# Patient Record
Sex: FEMALE | Race: BLACK OR AFRICAN AMERICAN | Employment: UNEMPLOYED | URBAN - METROPOLITAN AREA
[De-identification: names, ages, dates, MRNs, and addresses within clinical notes are randomized per-mention and may not be internally consistent; named-entity substitution may affect disease eponyms.]

---

## 2024-08-12 ENCOUNTER — TELEPHONE (OUTPATIENT)
Dept: ENDOCRINOLOGY | Facility: CLINIC | Age: 49
End: 2024-08-12

## 2024-08-12 ENCOUNTER — VIRTUAL VISIT (OUTPATIENT)
Dept: ENDOCRINOLOGY | Facility: CLINIC | Age: 49
End: 2024-08-12
Payer: MEDICARE

## 2024-08-12 VITALS — WEIGHT: 225 LBS

## 2024-08-12 DIAGNOSIS — E66.01 CLASS 2 SEVERE OBESITY DUE TO EXCESS CALORIES WITH SERIOUS COMORBIDITY IN ADULT, UNSPECIFIED BMI (H): Primary | ICD-10-CM

## 2024-08-12 DIAGNOSIS — E66.812 CLASS 2 SEVERE OBESITY DUE TO EXCESS CALORIES WITH SERIOUS COMORBIDITY IN ADULT, UNSPECIFIED BMI (H): Primary | ICD-10-CM

## 2024-08-12 PROCEDURE — 99204 OFFICE O/P NEW MOD 45 MIN: CPT | Mod: 95 | Performed by: INTERNAL MEDICINE

## 2024-08-12 RX ORDER — FERROUS GLUCONATE 324(38)MG
324 TABLET ORAL
COMMUNITY
Start: 2024-06-11

## 2024-08-12 RX ORDER — FERROUS SULFATE 325(65) MG
325 TABLET ORAL
COMMUNITY
Start: 2024-02-14

## 2024-08-12 RX ORDER — PHENTERMINE HYDROCHLORIDE 15 MG/1
15 CAPSULE ORAL EVERY MORNING
Qty: 30 CAPSULE | Refills: 5 | Status: SHIPPED | OUTPATIENT
Start: 2024-08-12

## 2024-08-12 RX ORDER — ALBUTEROL SULFATE 90 UG/1
2 AEROSOL, METERED RESPIRATORY (INHALATION)
COMMUNITY
Start: 2023-05-31

## 2024-08-12 RX ORDER — HYDROCHLOROTHIAZIDE 25 MG/1
1 TABLET ORAL DAILY
COMMUNITY
Start: 2024-07-22

## 2024-08-12 RX ORDER — PROPRANOLOL HYDROCHLORIDE 20 MG/1
20 TABLET ORAL
COMMUNITY
Start: 2024-05-29

## 2024-08-12 RX ORDER — LOSARTAN POTASSIUM 25 MG/1
1 TABLET ORAL DAILY
COMMUNITY
Start: 2024-07-18

## 2024-08-12 ASSESSMENT — SLEEP AND FATIGUE QUESTIONNAIRES
HOW LIKELY ARE YOU TO NOD OFF OR FALL ASLEEP WHILE LYING DOWN TO REST IN THE AFTERNOON WHEN CIRCUMSTANCES PERMIT: SLIGHT CHANCE OF DOZING
HOW LIKELY ARE YOU TO NOD OFF OR FALL ASLEEP WHILE SITTING AND TALKING TO SOMEONE: WOULD NEVER DOZE
HOW LIKELY ARE YOU TO NOD OFF OR FALL ASLEEP WHILE SITTING INACTIVE IN A PUBLIC PLACE: SLIGHT CHANCE OF DOZING
HOW LIKELY ARE YOU TO NOD OFF OR FALL ASLEEP WHILE SITTING QUIETLY AFTER LUNCH WITHOUT ALCOHOL: WOULD NEVER DOZE
HOW LIKELY ARE YOU TO NOD OFF OR FALL ASLEEP WHEN YOU ARE A PASSENGER IN A CAR FOR AN HOUR WITHOUT A BREAK: WOULD NEVER DOZE
HOW LIKELY ARE YOU TO NOD OFF OR FALL ASLEEP IN A CAR, WHILE STOPPED FOR A FEW MINUTES IN TRAFFIC: WOULD NEVER DOZE
HOW LIKELY ARE YOU TO NOD OFF OR FALL ASLEEP WHILE SITTING AND READING: SLIGHT CHANCE OF DOZING
HOW LIKELY ARE YOU TO NOD OFF OR FALL ASLEEP WHILE WATCHING TV: SLIGHT CHANCE OF DOZING

## 2024-08-12 NOTE — NURSING NOTE
Current patient location: [address] [facility] [address]    Is the patient currently in the state of [place]? YES    Visit mode:VIDEO    If the visit is dropped, the patient can be reconnected by: TELEPHONE VISIT: Phone number:   Telephone Information:   Mobile [phone]       Will anyone else be joining the visit? NO  (If patient encounters technical issues they should call [phone]    How would you like to obtain your AVS? MyChart    Are changes needed to the allergy or medication list? Pt stated no med changes    Are refills needed on medications prescribed by this physician? NO    Rooming Documentation:  Not applicable      Reason for visit: Video Visit (BMI 37)    Lesly LABOY

## 2024-08-12 NOTE — LETTER
"2024       RE: Radha Mi  [address] [facility] [address]     Dear Colleague,    Thank you for referring your patient, Radha Mi, to the [facility] WEIGHT MANAGEMENT CLINIC [place] [facility]. Please see a copy of my visit note below.    Virtual Visit Details    Joined the call at 2024, 10:10:24 am.  Left the call at 2024, 10:29:05 am.    Originating Location (pt. Location): Home    Distant Location (provider location):  Off-site  Platform used for Video Visit: [facility] Medical Weight Management Consult    PATIENT:  Radha Mi  MRN:         [id number]  :         1975  YU:         2024    Dear [facility],    I had the pleasure of seeing your patient, Radha Mi.  Full intake/assessment done to determine barriers to weight loss success and develop a treatment plan.  Radha Mi is a 49 year old female interested in treatment of medical problems associated with weight.  Her weight today is 225 lbs 0 oz, There is no height or weight on file to calculate BMI., and she has the following co-morbidities:      2024     9:43 AM   --   I have the following health issues associated with obesity High Blood Pressure   I have the following symptoms associated with obesity Depression            No data to display                    2024     9:43 AM   Referring Provider   Please name the provider who referred you to Medical Weight Management  If you do not know, please answer \"I Don't Know\" Non       Wt Readings from Last 4 Encounters:   24 102.1 kg (225 lb)           2024     9:43 AM   Weight History   How concerned are you about your weight? Very Concerned   I became overweight As an Adult   The following factors have contributed to my weight gain Mental Health Issues   I have tried the following methods to lose weight Watching Portions or " Calories    Exercise    Slim Fast or Other Liquid Diets    Fasting   My lowest weight since age 16 was 100   I have the following family history of obesity/being overweight My mother is overweight    My father is overweight   How has your weight changed over the last year? Gained   How many pounds? 30           8/12/2024     9:43 AM   Diet Recall   Glass milk/day 2   Alcohol Never           8/12/2024     9:43 AM   Eating Habits   Generally, my meals include foods like these bread, pasta, rice, potatoes, corn, crackers, sweet dessert, pop, or juice Less Than Weekly   Generally, my meals include foods like these fried meats, brats, burgers, french fries, pizza, cheese, chips, or ice cream Never   Eat fast food (like [employer], [employer], Taco Bell) Never   Eat at a buffet or sit-down restaurant Never   Eat most of my meals in front of the TV or computer Never   Often skip meals, eat at random times, have no regular eating times Everyday   Rarely sit down for a meal but snack or graze throughout Never   Eat extra snacks between meals Once a Week   Eat most of my food at the end of the day Never   Eat in the middle of the night or wake up at night to eat Never   Eat extra snacks to prevent or correct low blood sugar Never   Eat to prevent acid reflux or stomach pain Never   Worry about not having enough food to eat Never   I eat when I am depressed Never   I eat when I am stressed Never   I eat when I am bored Never   I eat when I am anxious Never   I eat when I am happy or as a reward Never   I feel hungry all the time even if I just have eaten Never   Feeling full is important to me Never   I finish all the food on my plate even if I am already full Never   I can't resist eating delicious food or walk past the good food/smell Never   I eat/snack without noticing that I am eating Never   I eat when I am preparing the meal Everyday   I eat more than usual when I see others eating Never   I have trouble not eating  sweets, ice cream, cookies, or chips if they are around the house Never   I think about food all day Never   What foods, if any, do you crave? None           8/12/2024     9:43 AM   Activity/Exercise History   How much of a typical 12 hour day do you spend sitting? Half the Day   How much of a typical 12 hour day do you spend lying down? Less Than Half the Day   How much of a typical day do you spend walking/standing? Half the Day   How many hours (not including work) do you spend on the TV/Video Games/Computer/Tablet/Phone? 2-3 Hours   How many times a week are you active for the purpose of exercise? 2-3 Times a Week   What keeps you from being more active? Shortness of Breath   How many total minutes do you spend doing some activity for the purpose of exercising when you exercise? More Than 30 Minutes       PAST MEDICAL HISTORY:  No past medical history on file.        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 Less Than 50%   What is your marital status? Single   If you have children, are they overweight? Yes   Who do you live with? 1           8/12/2024     9:43 AM   Mental Health History Reviewed With Patient   Have you ever been physically or sexually abused? No   How often in the past 2 weeks have you felt little interest or pleasure in doing things? Nearly Everyday   Over the past 2 weeks how often have you felt down, depressed, or hopeless? For Several Days           8/12/2024     9:43 AM   Sleep History Reviewed With Patient   How many hours do you sleep at night? 8       MEDICATIONS:   Current Outpatient Medications   Medication Sig Dispense Refill     albuterol (PROAIR HFA/PROVENTIL HFA/VENTOLIN HFA) 108 (90 Base) MCG/ACT inhaler Inhale 2 puffs into the lungs       ferrous gluconate (FERGON) 324 (38 Fe) MG tablet Take 324 mg by mouth daily (with breakfast)        "ferrous sulfate (FEROSUL) 325 (65 Fe) MG tablet Take 325 mg by mouth       FLUoxetine (PROZAC) 20 MG capsule Take 20 mg by mouth       hydrochlorothiazide (HYDRODIURIL) 25 MG tablet Take 1 tablet by mouth daily       losartan (COZAAR) 25 MG tablet Take 1 tablet by mouth daily       propranolol (INDERAL) 20 MG tablet Take 20 mg by mouth         ALLERGIES:   Allergies   Allergen Reactions     Latex Dermatitis, Itching and Rash       PHYSICAL EXAM:  Wt 102.1 kg (225 lb)   Ht 5' 4.5\"  A & O x 3  HEENT: NCAT, mucous membranes moist  Respirations unlabored  Location of obesity: Mixed Obesity    ASSESSMENT:  Radha is a patient with mature onset  obesity with significant element of familial/genetic influence and with current health consequences. She does need aggressive weight loss plan due to hypertension and depression.  Has been gaining since pregnancy to age 42. Has tried many things. Feels cannot lose by changing food.     Radha Mi eats a high fat diet.    Her problem is complicated by gender and short stature    Her ability to lose weight is impacted by lack of confidence.    PLAN:    Meal planning    Craving/Reward   Ancillary testing:  N/A.  Food Plan:  focus on no between meal snacking, aggressive lowering of starches and cheese.   Activity Plan:  Activity journal.  Supplementary:  N/A.   Medication:  The patient will begin medication in pursuit of improved medical status as influenced by body weight. She will start phentermine. Blood pressure and cardiac status are acceptable.  There is a mutual understanding of the goals and risks of this therapy. The patient is in agreement. She is educated on dosage regimen and possible side effects.    RTC:    12 weeks.    Sincerely,  Elie Varner MD            Again, thank you for allowing me to participate in the care of your patient.      Sincerely,    Elie Varner MD    "

## 2024-08-12 NOTE — PROGRESS NOTES
Virtual Visit Details    Joined the call at 8/12/2024, 10:10:24 am.  Left the call at 8/12/2024, 10:29:05 am.    Originating Location (pt. Location): Home    Distant Location (provider location):  Off-site  Platform used for Video Visit: Nohemy

## 2024-08-12 NOTE — TELEPHONE ENCOUNTER
Patient needs to be rescheduled for their virtual visit due to Reason for Reschedule: Patient Request Would like to check on insurance first.    Scheduling team, please refer to service line late cancellation/no-show policies and reach out to patient at a later date for rescheduling.    Appointment mode: Video  Provider: Viviana Ramirez RD

## 2024-08-12 NOTE — PROGRESS NOTES
"    New Medical Weight Management Consult    PATIENT:  Radha Mi  MRN:         [id number]  :         1975  YU:         2024    Dear [facility],    I had the pleasure of seeing your patient, Radha Mi.  Full intake/assessment done to determine barriers to weight loss success and develop a treatment plan.  Radha Mi is a 49 year old female interested in treatment of medical problems associated with weight.  Her weight today is 225 lbs 0 oz, There is no height or weight on file to calculate BMI., and she has the following co-morbidities:      2024     9:43 AM   --   I have the following health issues associated with obesity High Blood Pressure   I have the following symptoms associated with obesity Depression            No data to display                    2024     9:43 AM   Referring Provider   Please name the provider who referred you to Medical Weight Management  If you do not know, please answer \"I Don't Know\" Non       Wt Readings from Last 4 Encounters:   24 102.1 kg (225 lb)           2024     9:43 AM   Weight History   How concerned are you about your weight? Very Concerned   I became overweight As an Adult   The following factors have contributed to my weight gain Mental Health Issues   I have tried the following methods to lose weight Watching Portions or Calories    Exercise    Slim Fast or Other Liquid Diets    Fasting   My lowest weight since age 16 was 100   I have the following family history of obesity/being overweight My mother is overweight    My father is overweight   How has your weight changed over the last year? Gained   How many pounds? 30           2024     9:43 AM   Diet Recall   Glass milk/day 2   Alcohol Never           2024     9:43 AM   Eating Habits   Generally, my meals include foods like these bread, pasta, rice, potatoes, corn, crackers, sweet dessert, pop, or juice Less Than Weekly "   Generally, my meals include foods like these fried meats, brats, burgers, french fries, pizza, cheese, chips, or ice cream Never   Eat fast food (like [employer], [employer], Taco Bell) Never   Eat at a buffet or sit-down restaurant Never   Eat most of my meals in front of the TV or computer Never   Often skip meals, eat at random times, have no regular eating times Everyday   Rarely sit down for a meal but snack or graze throughout Never   Eat extra snacks between meals Once a Week   Eat most of my food at the end of the day Never   Eat in the middle of the night or wake up at night to eat Never   Eat extra snacks to prevent or correct low blood sugar Never   Eat to prevent acid reflux or stomach pain Never   Worry about not having enough food to eat Never   I eat when I am depressed Never   I eat when I am stressed Never   I eat when I am bored Never   I eat when I am anxious Never   I eat when I am happy or as a reward Never   I feel hungry all the time even if I just have eaten Never   Feeling full is important to me Never   I finish all the food on my plate even if I am already full Never   I can't resist eating delicious food or walk past the good food/smell Never   I eat/snack without noticing that I am eating Never   I eat when I am preparing the meal Everyday   I eat more than usual when I see others eating Never   I have trouble not eating sweets, ice cream, cookies, or chips if they are around the house Never   I think about food all day Never   What foods, if any, do you crave? None           8/12/2024     9:43 AM   Activity/Exercise History   How much of a typical 12 hour day do you spend sitting? Half the Day   How much of a typical 12 hour day do you spend lying down? Less Than Half the Day   How much of a typical day do you spend walking/standing? Half the Day   How many hours (not including work) do you spend on the TV/Video Games/Computer/Tablet/Phone? 2-3 Hours   How many times a week are you  "active for the purpose of exercise? 2-3 Times a Week   What keeps you from being more active? Shortness of Breath   How many total minutes do you spend doing some activity for the purpose of exercising when you exercise? More Than 30 Minutes       PAST MEDICAL HISTORY:  No past medical history on file.        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 Less Than 50%   What is your marital status? Single   If you have children, are they overweight? Yes   Who do you live with? 1           8/12/2024     9:43 AM   Mental Health History Reviewed With Patient   Have you ever been physically or sexually abused? No   How often in the past 2 weeks have you felt little interest or pleasure in doing things? Nearly Everyday   Over the past 2 weeks how often have you felt down, depressed, or hopeless? For Several Days           8/12/2024     9:43 AM   Sleep History Reviewed With Patient   How many hours do you sleep at night? 8       MEDICATIONS:   Current Outpatient Medications   Medication Sig Dispense Refill    albuterol (PROAIR HFA/PROVENTIL HFA/VENTOLIN HFA) 108 (90 Base) MCG/ACT inhaler Inhale 2 puffs into the lungs      ferrous gluconate (FERGON) 324 (38 Fe) MG tablet Take 324 mg by mouth daily (with breakfast)      ferrous sulfate (FEROSUL) 325 (65 Fe) MG tablet Take 325 mg by mouth      FLUoxetine (PROZAC) 20 MG capsule Take 20 mg by mouth      hydrochlorothiazide (HYDRODIURIL) 25 MG tablet Take 1 tablet by mouth daily      losartan (COZAAR) 25 MG tablet Take 1 tablet by mouth daily      propranolol (INDERAL) 20 MG tablet Take 20 mg by mouth         ALLERGIES:   Allergies   Allergen Reactions    Latex Dermatitis, Itching and Rash       PHYSICAL EXAM:  Wt 102.1 kg (225 lb)   Ht 5' 4.5\"  A & O x 3  HEENT: NCAT, mucous membranes moist  Respirations unlabored  Location of obesity: Mixed " Obesity    ASSESSMENT:  Radha is a patient with mature onset  obesity with significant element of familial/genetic influence and with current health consequences. She does need aggressive weight loss plan due to hypertension and depression.  Has been gaining since pregnancy to age 42. Has tried many things. Feels cannot lose by changing food.     Radha Mi eats a high fat diet.    Her problem is complicated by gender and short stature    Her ability to lose weight is impacted by lack of confidence.    PLAN:    Meal planning    Craving/Reward   Ancillary testing:  N/A.  Food Plan:  focus on no between meal snacking, aggressive lowering of starches and cheese.   Activity Plan:  Activity journal.  Supplementary:  N/A.   Medication:  The patient will begin medication in pursuit of improved medical status as influenced by body weight. She will start phentermine. Blood pressure and cardiac status are acceptable.  There is a mutual understanding of the goals and risks of this therapy. The patient is in agreement. She is educated on dosage regimen and possible side effects.    RTC:    12 weeks.    Sincerely,  Elie Varner MD

## 2024-08-15 ENCOUNTER — TELEPHONE (OUTPATIENT)
Dept: ENDOCRINOLOGY | Facility: CLINIC | Age: 49
End: 2024-08-15
Payer: MEDICARE

## 2024-08-15 NOTE — TELEPHONE ENCOUNTER
Patient confirmed scheduled appointment:  Date: 12/30/24  Time: 1045am  Visit type: Return Weight Mgmt  Provider: Dr Varner  Location: [facility]

## 2024-10-04 ENCOUNTER — TELEPHONE (OUTPATIENT)
Dept: ENDOCRINOLOGY | Facility: CLINIC | Age: 49
End: 2024-10-04
Payer: MEDICARE

## 2024-10-04 DIAGNOSIS — E66.01 CLASS 2 SEVERE OBESITY DUE TO EXCESS CALORIES WITH SERIOUS COMORBIDITY IN ADULT, UNSPECIFIED BMI (H): ICD-10-CM

## 2024-10-04 DIAGNOSIS — E66.812 CLASS 2 SEVERE OBESITY DUE TO EXCESS CALORIES WITH SERIOUS COMORBIDITY IN ADULT, UNSPECIFIED BMI (H): ICD-10-CM

## 2024-10-04 NOTE — TELEPHONE ENCOUNTER
Medication Question or Refill    Contacts       Contact Date/Time Type Contact Phone/Fax    10/04/2024 08:56 AM CDT Phone (Incoming) Radha Mi (Self) [phone] (H)     I would like to have an increase of medication. The current prescription has not been helping and I've only had side effects such as constipation and I don't/feel see any changes happening            What medication are you calling about (include dose and sig)?: phentermine 15 MG capsule     Preferred Pharmacy:     [facility] [address] [place] [id number]  Phone: [phone] Fax: [phone]      Controlled Substance Agreement on file:   CSA -- Patient Level:    CSA: None found at the patient level.       Who prescribed the medication?: Elie Varner MD     Do you need a refill? Yes    When did you use the medication last? 9/30/24    Patient offered an appointment? No    Do you have any questions or concerns?  Yes: Have been having constipation side effects and no signs of any appetitive changes.     Patient had to discontinue taking the medication due to the side effect pain    Would like a call back from the care team    Could we send this information to you in [facility] or would you prefer to receive a phone call?:   Patient would prefer a phone call   Okay to leave a detailed message?: Yes at Cell number on file:    Telephone Information:   Mobile [phone]

## 2024-10-21 RX ORDER — PHENTERMINE HYDROCHLORIDE 30 MG/1
30 CAPSULE ORAL EVERY MORNING
Qty: 30 CAPSULE | Refills: 5 | Status: SHIPPED | OUTPATIENT
Start: 2024-10-21

## 2025-03-29 ENCOUNTER — HEALTH MAINTENANCE LETTER (OUTPATIENT)
Age: 50
End: 2025-03-29

## 2025-04-28 ENCOUNTER — VIRTUAL VISIT (OUTPATIENT)
Dept: ENDOCRINOLOGY | Facility: CLINIC | Age: 50
End: 2025-04-28
Attending: INTERNAL MEDICINE
Payer: MEDICARE

## 2025-04-28 VITALS — WEIGHT: 218 LBS

## 2025-04-28 DIAGNOSIS — E66.812 CLASS 2 SEVERE OBESITY DUE TO EXCESS CALORIES WITH SERIOUS COMORBIDITY IN ADULT, UNSPECIFIED BMI (H): ICD-10-CM

## 2025-04-28 DIAGNOSIS — E66.01 CLASS 2 SEVERE OBESITY DUE TO EXCESS CALORIES WITH SERIOUS COMORBIDITY IN ADULT, UNSPECIFIED BMI (H): ICD-10-CM

## 2025-04-28 PROCEDURE — 1126F AMNT PAIN NOTED NONE PRSNT: CPT | Mod: 95 | Performed by: INTERNAL MEDICINE

## 2025-04-28 PROCEDURE — 98004 SYNCH AUDIO-VIDEO EST SF 10: CPT | Performed by: INTERNAL MEDICINE

## 2025-04-28 RX ORDER — TOPIRAMATE 25 MG/1
TABLET, FILM COATED ORAL
Qty: 90 TABLET | Refills: 5 | Status: SHIPPED | OUTPATIENT
Start: 2025-04-28

## 2025-04-28 RX ORDER — PHENTERMINE HYDROCHLORIDE 30 MG/1
30 CAPSULE ORAL EVERY MORNING
Qty: 30 CAPSULE | Refills: 5 | Status: SHIPPED | OUTPATIENT
Start: 2025-04-28

## 2025-04-28 ASSESSMENT — PAIN SCALES - GENERAL: PAINLEVEL_OUTOF10: NO PAIN (0)

## 2025-04-28 NOTE — NURSING NOTE
Is the patient currently in the state of [place]? YES    Location: vehicle, not driving    Visit mode:VIDEO    If the visit is dropped, the patient can be reconnected by: VIDEO VISIT: Text to cell phone:   Telephone Information:   Mobile [phone]    and VIDEO VISIT: Send to e-mail at: [email or web address]    Will anyone else be joining the visit? NO  (If patient encounters technical issues they should call [phone]    Are changes needed to the allergy or medication list? No    Are refills needed on medications prescribed by this physician? NO    Reason for visit: AMADO Razo, Virtual Visit Facilitator    QNR Status: completed

## 2025-04-28 NOTE — LETTER
2025       RE: Radha Mi  [address] [facility] [address]     Dear Colleague,    Thank you for referring your patient, Radha Mi, to the [facility] WEIGHT MANAGEMENT CLINIC [place] at [facility]. Please see a copy of my visit note below.    Virtual Visit Details    Joined the call at 2025, 9:56:41 am.  Left the call at 2025, 10:00:30     Originating Location (pt. Location): Home    Distant Location (provider location):  Off-site  Platform used for Video Visit: [facility] Medical Weight Management Note     Radha Mi  MRN:  [id number]  :  1975  YU:  2025    Dear [facility],    I had the pleasure of seeing your patient Radha Mi.  She is a 50 year old female who I am continuing to see for treatment of obesity related to:      2024     9:43 AM   --   I have the following health issues associated with obesity High Blood Pressure   I have the following symptoms associated with obesity Depression     CURRENT WEIGHT:   218 lbs 0 oz    Wt Readings from Last 4 Encounters:   25 98.9 kg (218 lb)   24 102.1 kg (225 lb)     Height:  Data Unavailable  Body Mass Index:  There is no height or weight on file to calculate BMI.  Vitals:  B/P: Data Unavailable, P: Data Unavailable    Initial consult weight was 224 on 24.  Weight change since last seen on 24 is down 6 pounds.   Total loss is 6 pounds.    INTERVAL HISTORY:  Has lost little weight on phentermine 30/d. Says is not eating much but still not losing weight.        2024     9:43 AM   Diet Recall Review with Patient   How many of glasses of milk do you drink in a typical day? 2   If you do drink milk, what type? Whole   How often do you have a drink of alcohol? Never     MEDICATIONS:   Current Outpatient Medications   Medication Sig Dispense Refill     albuterol (PROAIR HFA/PROVENTIL  HFA/VENTOLIN HFA) 108 (90 Base) MCG/ACT inhaler Inhale 2 puffs into the lungs       ferrous gluconate (FERGON) 324 (38 Fe) MG tablet Take 324 mg by mouth daily (with breakfast)       ferrous sulfate (FEROSUL) 325 (65 Fe) MG tablet Take 325 mg by mouth       FLUoxetine (PROZAC) 20 MG capsule Take 20 mg by mouth       hydrochlorothiazide (HYDRODIURIL) 25 MG tablet Take 1 tablet by mouth daily       losartan (COZAAR) 25 MG tablet Take 1 tablet by mouth daily       phentermine 30 MG capsule Take 1 capsule (30 mg) by mouth every morning. 30 capsule 5     propranolol (INDERAL) 20 MG tablet Take 20 mg by mouth       No current facility-administered medications for this visit.          No data to display              ASSESSMENT:   Maintain phnetermine 30/d and trial topiramate 25=>75mg HS.    FOLLOW-UP:    12 weeks.    Sincerely,  Elie Varner MD      Again, thank you for allowing me to participate in the care of your patient.      Sincerely,    Elie Varner MD

## 2025-04-28 NOTE — PROGRESS NOTES
Virtual Visit Details    Joined the call at 4/28/2025, 9:56:41 am.  Left the call at 4/28/2025, 10:00:30     Originating Location (pt. Location): Home    Distant Location (provider location):  Off-site  Platform used for Video Visit: Nohemy

## 2025-04-28 NOTE — PROGRESS NOTES
Return Medical Weight Management Note     Radha Mi  MRN:  [id number]  :  1975  YU:  2025    Dear [facility],    I had the pleasure of seeing your patient Radha Mi.  She is a 50 year old female who I am continuing to see for treatment of obesity related to:      2024     9:43 AM   --   I have the following health issues associated with obesity High Blood Pressure   I have the following symptoms associated with obesity Depression     CURRENT WEIGHT:   218 lbs 0 oz    Wt Readings from Last 4 Encounters:   25 98.9 kg (218 lb)   24 102.1 kg (225 lb)     Height:  Data Unavailable  Body Mass Index:  There is no height or weight on file to calculate BMI.  Vitals:  B/P: Data Unavailable, P: Data Unavailable    Initial consult weight was 224 on 24.  Weight change since last seen on 24 is down 6 pounds.   Total loss is 6 pounds.    INTERVAL HISTORY:  Has lost little weight on phentermine 30/d. Says is not eating much but still not losing weight.        2024     9:43 AM   Diet Recall Review with Patient   How many of glasses of milk do you drink in a typical day? 2   If you do drink milk, what type? Whole   How often do you have a drink of alcohol? Never     MEDICATIONS:   Current Outpatient Medications   Medication Sig Dispense Refill    albuterol (PROAIR HFA/PROVENTIL HFA/VENTOLIN HFA) 108 (90 Base) MCG/ACT inhaler Inhale 2 puffs into the lungs      ferrous gluconate (FERGON) 324 (38 Fe) MG tablet Take 324 mg by mouth daily (with breakfast)      ferrous sulfate (FEROSUL) 325 (65 Fe) MG tablet Take 325 mg by mouth      FLUoxetine (PROZAC) 20 MG capsule Take 20 mg by mouth      hydrochlorothiazide (HYDRODIURIL) 25 MG tablet Take 1 tablet by mouth daily      losartan (COZAAR) 25 MG tablet Take 1 tablet by mouth daily      phentermine 30 MG capsule Take 1 capsule (30 mg) by mouth every morning. 30 capsule 5    propranolol (INDERAL) 20 MG  tablet Take 20 mg by mouth       No current facility-administered medications for this visit.          No data to display              ASSESSMENT:   Maintain phnetermine 30/d and trial topiramate 25=>75mg HS.    FOLLOW-UP:    12 weeks.    Sincerely,  Elie Varner MD

## 2025-05-01 ENCOUNTER — TELEPHONE (OUTPATIENT)
Dept: ENDOCRINOLOGY | Facility: CLINIC | Age: 50
End: 2025-05-01
Payer: MEDICARE

## 2025-05-01 NOTE — TELEPHONE ENCOUNTER
Patient confirmed scheduled appointment:     Date: 10/27/25  Time: 10:45 AM  Visit type: Return Weight Management  Visit mode: Virtual Visit  Provider:  Dr. Elie Varner  Location: [facility] [place]    Additional Notes:   Added to wait list

## 2025-05-14 ENCOUNTER — TRANSCRIBE ORDERS (OUTPATIENT)
Dept: OTHER | Age: 50
End: 2025-05-14

## 2025-05-14 ENCOUNTER — PATIENT OUTREACH (OUTPATIENT)
Dept: ONCOLOGY | Facility: CLINIC | Age: 50
End: 2025-05-14
Payer: MEDICARE

## 2025-05-14 DIAGNOSIS — Z80.3 FAMILY HISTORY OF BREAST CANCER: ICD-10-CM

## 2025-05-14 DIAGNOSIS — Z80.49 FAMILY HISTORY OF CERVICAL CANCER: Primary | ICD-10-CM

## 2025-05-14 NOTE — PROGRESS NOTES
Writer received referral to Cancer Risk Management/Genetic Counseling.    Referred for:    Family history of cervical cancer; family history of breast cancer    Referred by:  Radha Mi MD at [facility]    Referral reviewed for appropriate plan, and sent to New Patient Scheduling ([phone]) for completion.    Tara Johnson, RN, BSN  Oncology New Patient Nurse Navigator   [facility]